# Patient Record
Sex: FEMALE | Race: WHITE | NOT HISPANIC OR LATINO | Employment: OTHER | URBAN - METROPOLITAN AREA
[De-identification: names, ages, dates, MRNs, and addresses within clinical notes are randomized per-mention and may not be internally consistent; named-entity substitution may affect disease eponyms.]

---

## 2017-05-10 ENCOUNTER — OFFICE VISIT (OUTPATIENT)
Dept: ORTHOPEDIC SURGERY | Facility: CLINIC | Age: 72
End: 2017-05-10

## 2017-05-10 VITALS
HEART RATE: 99 BPM | DIASTOLIC BLOOD PRESSURE: 98 MMHG | SYSTOLIC BLOOD PRESSURE: 149 MMHG | HEIGHT: 66 IN | BODY MASS INDEX: 28.93 KG/M2 | WEIGHT: 180 LBS

## 2017-05-10 DIAGNOSIS — R60.9 SWELLING: ICD-10-CM

## 2017-05-10 DIAGNOSIS — M25.571 PAIN, JOINT, ANKLE AND FOOT, RIGHT: Primary | ICD-10-CM

## 2017-05-10 PROCEDURE — 99204 OFFICE O/P NEW MOD 45 MIN: CPT | Performed by: ORTHOPAEDIC SURGERY

## 2017-05-10 RX ORDER — DILTIAZEM HYDROCHLORIDE 180 MG/1
180 CAPSULE, COATED, EXTENDED RELEASE ORAL DAILY
COMMUNITY

## 2017-05-10 RX ORDER — LEVOTHYROXINE SODIUM 0.05 MG/1
50 TABLET ORAL DAILY
COMMUNITY

## 2017-05-10 RX ORDER — ESTRADIOL 1 MG/1
1 TABLET ORAL DAILY
COMMUNITY

## 2017-05-10 RX ORDER — TRAMADOL HYDROCHLORIDE 50 MG/1
1 TABLET ORAL 3 TIMES DAILY PRN
Refills: 0 | COMMUNITY
Start: 2017-04-18

## 2017-05-10 RX ORDER — PANTOPRAZOLE SODIUM 40 MG/1
40 TABLET, DELAYED RELEASE ORAL DAILY
COMMUNITY

## 2017-05-30 ENCOUNTER — TELEPHONE (OUTPATIENT)
Dept: ORTHOPEDIC SURGERY | Facility: CLINIC | Age: 72
End: 2017-05-30

## 2017-06-01 ENCOUNTER — OFFICE VISIT (OUTPATIENT)
Dept: ORTHOPEDIC SURGERY | Facility: CLINIC | Age: 72
End: 2017-06-01

## 2017-06-01 VITALS
DIASTOLIC BLOOD PRESSURE: 103 MMHG | BODY MASS INDEX: 29.82 KG/M2 | HEART RATE: 98 BPM | WEIGHT: 179 LBS | SYSTOLIC BLOOD PRESSURE: 173 MMHG | HEIGHT: 65 IN

## 2017-06-01 DIAGNOSIS — M25.551 HIP PAIN, RIGHT: Primary | ICD-10-CM

## 2017-06-01 DIAGNOSIS — S79.911A HIP INJURY, RIGHT, INITIAL ENCOUNTER: ICD-10-CM

## 2017-06-01 DIAGNOSIS — M70.61 TROCHANTERIC BURSITIS OF RIGHT HIP: ICD-10-CM

## 2017-06-01 DIAGNOSIS — E66.9 OBESITY (BMI 30-39.9): ICD-10-CM

## 2017-06-01 PROCEDURE — 99215 OFFICE O/P EST HI 40 MIN: CPT | Performed by: ORTHOPAEDIC SURGERY

## 2017-06-01 RX ORDER — ZOLPIDEM TARTRATE 10 MG/1
TABLET ORAL
Refills: 0 | COMMUNITY
Start: 2017-05-11

## 2017-06-01 RX ORDER — NAPROXEN SODIUM 220 MG
220 TABLET ORAL 2 TIMES DAILY PRN
COMMUNITY

## 2017-06-01 RX ORDER — BUPROPION HYDROCHLORIDE 75 MG/1
75 TABLET ORAL DAILY
COMMUNITY

## 2017-06-01 NOTE — PROGRESS NOTES
Orthopaedic Clinic Note: Hip New Patient    Chief Complaint   Patient presents with   • Right Hip - Pain        HPI    Ana Roger is a 72 y.o. female who presents with right hip pain for 3 week(s). Patient was initially seeing Dr. Sheehan for right foot pain has been ongoing for a little over a month.  Since the onset of her foot pain, however she has developed a 3 week history of right hip pain that has progressed in severity and frequency.  The onset was atraumatic in nature. Pain is localized to groin and lateral trochanter and is a 7/10 on the pain scale. The pain is worse with walking, sitting, standing for long periods and bending the hip; resting and lying down improve the pain. Previous treatments have included: NSAIDS since symptom onset. Although some transient relief was reported with these interventions, these conservative measures have failed and symptoms have persisted. The patient is limited in daily activities and has had a significant decrease in quality of life as a result. She denies fevers, chills, or constitutional symptoms.  She denies any recent illnesses.      Past Medical History:   Diagnosis Date   • Asthma    • Degenerative arthritis    • Eye problems    • Hypertension    • Hypothyroid    • Kidney stones    • Osteoporosis    • Reflux esophagitis    • Shortness of breath       Past Surgical History:   Procedure Laterality Date   • APPENDECTOMY     • CHOLECYSTECTOMY     • FRACTURE SURGERY Left     arm   • INGUINAL HERNIA REPAIR Right    • KIDNEY STONE SURGERY        Social History     Social History   • Marital status:      Spouse name: N/A   • Number of children: N/A   • Years of education: N/A     Occupational History   • Not on file.     Social History Main Topics   • Smoking status: Never Smoker   • Smokeless tobacco: Never Used   • Alcohol use No   • Drug use: No   • Sexual activity: Defer     Other Topics Concern   • Not on file     Social History Narrative      Current  "Outpatient Prescriptions on File Prior to Visit   Medication Sig Dispense Refill   • diltiaZEM CD (CARDIZEM CD) 180 MG 24 hr capsule Take 180 mg by mouth Daily.     • estradiol (ESTRACE) 1 MG tablet Take 1 mg by mouth Daily.     • levothyroxine (SYNTHROID, LEVOTHROID) 50 MCG tablet Take 50 mcg by mouth Daily.     • pantoprazole (PROTONIX) 40 MG EC tablet Take 40 mg by mouth Daily.     • traMADol (ULTRAM) 50 MG tablet Take 1 tablet by mouth 3 (Three) Times a Day As Needed.  0     No current facility-administered medications on file prior to visit.       No Known Allergies     Review of Systems   Constitutional: Negative.    HENT:        Vision problems, sensitivity to light, ringing in ears   Eyes: Negative.    Respiratory: Positive for shortness of breath.    Cardiovascular: Positive for leg swelling (swelling in hands and feet).   Gastrointestinal:        Reflux, heartburn   Endocrine: Positive for cold intolerance.   Genitourinary: Negative.    Musculoskeletal: Positive for arthralgias, back pain, joint swelling and myalgias.   Skin: Negative.    Allergic/Immunologic: Negative.    Neurological: Positive for dizziness and headaches.   Hematological: Negative.    Psychiatric/Behavioral: The patient is nervous/anxious (depression).         The following portions of the patient's history were reviewed and updated as appropriate: allergies, current medications, past family history, past medical history, past social history, past surgical history and problem list.    Physical Exam  Blood pressure (!) 173/103, pulse 98, height 64.5\" (163.8 cm), weight 179 lb (81.2 kg).    Body mass index is 30.25 kg/(m^2).    GENERAL APPEARANCE: awake, alert & oriented x 3, in no acute distress, well developed, well nourished and overweight  PSYCH: normal affect  LUNGS:  breathing nonlabored  EYES: sclera anicteric  CARDIOVASCULAR: palpable dorsalis pedis, palpable posterior tibial bilaterally. Capillary refill less than 2 " seconds  EXTREMITIES: no clubbing, cyanosis  GAIT:  Trendelenberg           Right Hip Exam:  RANGE OF MOTION:   FLEXION CONTRACTURE: None  FLEXION: 110 degrees  INTERNAL ROTATION: 15 degrees at 90 degrees of flexion   EXTERNAL ROTATION: 35 degrees at 90 degrees of flexion    PAIN WITH HIP MOTION: yes, especially with internal rotation  PAIN WITH LOGROLL: no  STINCHFIELD TEST: positive    KNEE EXAM: full knee ROM (0-130), stable to varus/valgus stress at 0 and 30 degrees     STRENGTH:  4/5 hip adduction, abduction, flexion. 5/5 knee flexion, extension. 5/5 ankle dorsiflexion and plantarflexion.     GREATER TROCHANTER BURSAL PAIN:  yes     REFLEXES:   PATELLAR 2+/4   ACHILLES 2+/4    CLONUS: no  STRAIGHT LEG TEST:   negative    SENSATION TO LIGHT TOUCH:  DEEP PERONEAL/SUPERFICIAL PERONEAL/SURAL/SAPHENOUS/TIBIAL:  intact    EDEMA:   no  ERYTHEMA:  no  WOUNDS/INCISIONS: no        Left Hip Exam:   RANGE OF MOTION:   FLEXION CONTRACTURE: None   FLEXION: 110 degrees   INTERNAL ROTATION: 20 degrees at 90 degrees of flexion   EXTERNAL ROTATION: 40 degrees at 90 degrees of flexion    PAIN WITH HIP MOTION: no  PAIN WITH LOGROLL: no  STINCHFIELD TEST: negative    KNEE EXAM: full knee ROM (0-130), stable to varus/valgus stress at 0 and 30 degrees     STRENGTH:  5/5 hip adduction, abduction, flexion. 5/5 strength knee flexion, extension. 5/5 strength ankle dorsiflexion and plantarflexion.     GREATER TROCHANTER BURSAL PAIN:  no     REFLEXES:   PATELLAR 2+/4   ACHILLES 2+/4    CLONUS: negative  STRAIGHT LEG TEST:   negative    SENSATION TO LIGHT TOUCH:  DEEP PERONEAL/SUPERFICIAL PERONEAL/SURAL/SAPHENOUS/TIBIAL:  intact    EDEMA:   no  ERYTHEMA:  no  WOUNDS/INCISIONS: none, no overlying skin problems.      ------------------------------------------------------------------    LEG LENGTHS:  equal  _____________________________________________________  _____________________________________________________    RADIOGRAPHIC FINDINGS:    Indication: Right hip pain     Comparison: No prior xrays are available for comparison    AP pelvis, hip 2 views: Right: minimal arthritic findings with preservation of the joint space; Left: minimal arthritic findings with preservation of the joint space.  Right hip demonstrates no evidence of avascular necrosis or step off at the articular surface of the femoral head.  No evidence of occult fracture.    Assessment/Plan:   Diagnosis Plan   1. Hip pain, right  XR Hip With or Without Pelvis 1 View Right    MRI Hip Right Without Contrast   2. Hip injury, right, initial encounter  MRI Hip Right Without Contrast   3. Trochanteric bursitis of right hip  MRI Hip Right Without Contrast   4. Obesity (BMI 30-39.9)       I discussed with patient that she does have significant clinical findings concerning for intra-articular hip process with associated trochanteric bursitis.  In the absence of severe hip range of motion limitations, I'm less concerned about infection.  However I did explain to her that an occult process such as avascular necrosis or labral tear could be ongoing that is resulting in the right hip pain.  I gave her treatment options including MRI of the right hip, intra-articular injection into the right hip, or injection of the right hip trochanteric bursa.  I recommended an MRI to further evaluate for pathology.  She was extremely hesitant to do this which is why I offered her the other 2 injection options.  I explained to her that the MRI would be the best course of action in order to evaluate for possible soft tissue pathology versus occult bony pathology as a source of her pain.  After extensive discussion, she was agreeable to proceed with the MRI of the right hip.  She has a scheduled MRI of the right ankle/foot on 6/5.  We will attempt to get this hip MRI scheduled the same day.  I will see the patient back after the MRI has been obtained to discuss the results and treatment options.    Sudhakar STEPHENS  MD Rajni  06/01/17  2:18 PM

## 2017-06-05 ENCOUNTER — OFFICE VISIT (OUTPATIENT)
Dept: ORTHOPEDIC SURGERY | Facility: CLINIC | Age: 72
End: 2017-06-05

## 2017-06-05 ENCOUNTER — HOSPITAL ENCOUNTER (OUTPATIENT)
Dept: MRI IMAGING | Facility: HOSPITAL | Age: 72
Discharge: HOME | End: 2017-06-05
Attending: ORTHOPAEDIC SURGERY | Admitting: ORTHOPAEDIC SURGERY

## 2017-06-05 ENCOUNTER — LAB (OUTPATIENT)
Dept: LAB | Facility: HOSPITAL | Age: 72
End: 2017-06-05

## 2017-06-05 DIAGNOSIS — M25.551 HIP PAIN, RIGHT: Primary | ICD-10-CM

## 2017-06-05 DIAGNOSIS — M25.551 HIP PAIN, RIGHT: ICD-10-CM

## 2017-06-05 DIAGNOSIS — M25.571 PAIN, JOINT, ANKLE AND FOOT, RIGHT: ICD-10-CM

## 2017-06-05 DIAGNOSIS — M70.61 TROCHANTERIC BURSITIS OF RIGHT HIP: ICD-10-CM

## 2017-06-05 DIAGNOSIS — S79.911A HIP INJURY, RIGHT, INITIAL ENCOUNTER: ICD-10-CM

## 2017-06-05 PROBLEM — M25.579 PAIN, JOINT, ANKLE AND FOOT: Status: ACTIVE | Noted: 2017-06-05

## 2017-06-05 LAB
CRP SERPL-MCNC: 1.31 MG/DL (ref 0–1)
ERYTHROCYTE [SEDIMENTATION RATE] IN BLOOD: 35 MM/HR (ref 0–30)
URATE SERPL-MCNC: 5.1 MG/DL (ref 3.1–7.8)

## 2017-06-05 PROCEDURE — 84550 ASSAY OF BLOOD/URIC ACID: CPT | Performed by: ORTHOPAEDIC SURGERY

## 2017-06-05 PROCEDURE — 86140 C-REACTIVE PROTEIN: CPT | Performed by: ORTHOPAEDIC SURGERY

## 2017-06-05 PROCEDURE — 73721 MRI JNT OF LWR EXTRE W/O DYE: CPT

## 2017-06-05 PROCEDURE — 85652 RBC SED RATE AUTOMATED: CPT | Performed by: ORTHOPAEDIC SURGERY

## 2017-06-05 PROCEDURE — 73718 MRI LOWER EXTREMITY W/O DYE: CPT

## 2017-06-05 PROCEDURE — 99213 OFFICE O/P EST LOW 20 MIN: CPT | Performed by: ORTHOPAEDIC SURGERY

## 2017-06-05 PROCEDURE — 36415 COLL VENOUS BLD VENIPUNCTURE: CPT

## 2017-06-05 NOTE — PROGRESS NOTES
ESTABLISHED PATIENT    Patient: Ana Roger  : 1945    Primary Care Provider: Thomas Mahoney MD    Requesting Provider: As above    Follow-up of the Right Foot and Follow-up of the Right Ankle      History    Chief Complaint: right foot and hip pain    History of Present Illness: she returns after the MRI of the right foot- it is basically normal now, with a small amount of fluid around tendons, but the edema in the hindfoot and ankle has resolved.  She notes that it is much less painful now, only occasional ache in the midfoot.  The swelling is improved.  Now she is having severe right hip pain, she has an MRI that showed effusion, Dr Urban will see her this week, and asked me to order labs.    Current Outpatient Prescriptions on File Prior to Visit   Medication Sig Dispense Refill   • buPROPion (WELLBUTRIN) 75 MG tablet Take 75 mg by mouth Daily.     • diltiaZEM CD (CARDIZEM CD) 180 MG 24 hr capsule Take 180 mg by mouth Daily.     • estradiol (ESTRACE) 1 MG tablet Take 1 mg by mouth Daily.     • levothyroxine (SYNTHROID, LEVOTHROID) 50 MCG tablet Take 50 mcg by mouth Daily.     • naproxen sodium (ALEVE) 220 MG tablet Take 220 mg by mouth 2 (Two) Times a Day As Needed for Mild Pain (1-3).     • pantoprazole (PROTONIX) 40 MG EC tablet Take 40 mg by mouth Daily.     • traMADol (ULTRAM) 50 MG tablet Take 1 tablet by mouth 3 (Three) Times a Day As Needed.  0   • zolpidem (AMBIEN) 10 MG tablet TAKE ONE TABLET BY MOUTH EVERY NIGHT AT BEDTIME  0     No current facility-administered medications on file prior to visit.       No Known Allergies   Past Medical History:   Diagnosis Date   • Asthma    • Degenerative arthritis    • Eye problems    • Hypertension    • Hypothyroid    • Kidney stones    • Osteoporosis    • Reflux esophagitis    • Shortness of breath      Past Surgical History:   Procedure Laterality Date   • APPENDECTOMY     • CHOLECYSTECTOMY     • FRACTURE SURGERY Left     arm   • INGUINAL  HERNIA REPAIR Right    • KIDNEY STONE SURGERY       Family History   Problem Relation Age of Onset   • Heart attack Mother    • Heart disease Mother    • Hypertension Mother    • Osteoarthritis Mother    • Rheum arthritis Mother    • Stroke Mother    • Heart disease Father    • Heart disease Other    • Hypertension Other    • Osteoarthritis Other       Social History     Social History   • Marital status:      Spouse name: N/A   • Number of children: N/A   • Years of education: N/A     Occupational History   • Not on file.     Social History Main Topics   • Smoking status: Never Smoker   • Smokeless tobacco: Never Used   • Alcohol use No   • Drug use: No   • Sexual activity: Defer     Other Topics Concern   • Not on file     Social History Narrative        Review of Systems    The following portions of the patient's history were reviewed and updated as appropriate: allergies, current medications, past family history, past medical history, past social history, past surgical history and problem list.    Physical Exam:   There were no vitals taken for this visit.  Right foot is no longer tender, no swelling, normal ROM    Medical Decision Making    Data Review:   reviewed radiology images    and reviewed radiology results       Assessment/Plan/Diagnosis/Treatment Options:   Right foot is much improved.  I will be happy to see her any time.  I ordered labs and she will see Dr Urban this week.

## 2017-06-06 DIAGNOSIS — M25.451 HIP EFFUSION, RIGHT: Primary | ICD-10-CM

## 2017-06-07 ENCOUNTER — HOSPITAL ENCOUNTER (OUTPATIENT)
Dept: GENERAL RADIOLOGY | Facility: HOSPITAL | Age: 72
Discharge: HOME | End: 2017-06-07
Attending: ORTHOPAEDIC SURGERY | Admitting: ORTHOPAEDIC SURGERY

## 2017-06-07 DIAGNOSIS — M25.451 HIP EFFUSION, RIGHT: ICD-10-CM

## 2017-06-07 LAB
APPEARANCE FLD: ABNORMAL
COLOR FLD: COLORLESS
CRYSTALS FLD MICRO: NORMAL
RBC # FLD AUTO: <1000 /MM3
WBC # FLD: 0 /MM3

## 2017-06-07 PROCEDURE — 87205 SMEAR GRAM STAIN: CPT | Performed by: ORTHOPAEDIC SURGERY

## 2017-06-07 PROCEDURE — 87206 SMEAR FLUORESCENT/ACID STAI: CPT | Performed by: ORTHOPAEDIC SURGERY

## 2017-06-07 PROCEDURE — 87015 SPECIMEN INFECT AGNT CONCNTJ: CPT | Performed by: ORTHOPAEDIC SURGERY

## 2017-06-07 PROCEDURE — 89060 EXAM SYNOVIAL FLUID CRYSTALS: CPT | Performed by: ORTHOPAEDIC SURGERY

## 2017-06-07 PROCEDURE — 87070 CULTURE OTHR SPECIMN AEROBIC: CPT | Performed by: ORTHOPAEDIC SURGERY

## 2017-06-07 PROCEDURE — 87102 FUNGUS ISOLATION CULTURE: CPT | Performed by: ORTHOPAEDIC SURGERY

## 2017-06-07 PROCEDURE — 87116 MYCOBACTERIA CULTURE: CPT | Performed by: ORTHOPAEDIC SURGERY

## 2017-06-07 PROCEDURE — 77002 NEEDLE LOCALIZATION BY XRAY: CPT

## 2017-06-07 PROCEDURE — 87075 CULTR BACTERIA EXCEPT BLOOD: CPT | Performed by: ORTHOPAEDIC SURGERY

## 2017-06-07 PROCEDURE — 0 IOPAMIDOL 61 % SOLUTION: Performed by: ORTHOPAEDIC SURGERY

## 2017-06-07 PROCEDURE — 89050 BODY FLUID CELL COUNT: CPT | Performed by: ORTHOPAEDIC SURGERY

## 2017-06-07 RX ORDER — LIDOCAINE HYDROCHLORIDE 10 MG/ML
20 INJECTION, SOLUTION INFILTRATION; PERINEURAL ONCE
Status: COMPLETED | OUTPATIENT
Start: 2017-06-07 | End: 2017-06-07

## 2017-06-07 RX ADMIN — LIDOCAINE HYDROCHLORIDE 20 ML: 10 INJECTION, SOLUTION INFILTRATION; PERINEURAL at 13:50

## 2017-06-07 RX ADMIN — IOPAMIDOL 5 ML: 612 INJECTION, SOLUTION INTRAVENOUS at 13:51

## 2017-06-11 LAB
BACTERIA FLD CULT: NORMAL
GRAM STN SPEC: NORMAL
GRAM STN SPEC: NORMAL

## 2017-06-20 ENCOUNTER — OFFICE VISIT (OUTPATIENT)
Dept: ORTHOPEDIC SURGERY | Facility: CLINIC | Age: 72
End: 2017-06-20

## 2017-06-20 VITALS
HEART RATE: 98 BPM | DIASTOLIC BLOOD PRESSURE: 80 MMHG | SYSTOLIC BLOOD PRESSURE: 143 MMHG | WEIGHT: 180 LBS | BODY MASS INDEX: 29.99 KG/M2 | HEIGHT: 65 IN

## 2017-06-20 DIAGNOSIS — M25.551 PAIN OF RIGHT HIP JOINT: Primary | ICD-10-CM

## 2017-06-20 DIAGNOSIS — M12.851: ICD-10-CM

## 2017-06-20 PROCEDURE — 99213 OFFICE O/P EST LOW 20 MIN: CPT | Performed by: ORTHOPAEDIC SURGERY

## 2017-06-20 NOTE — PROGRESS NOTES
Orthopaedic Clinic Note: Hip Established Patient    Chief Complaint   Patient presents with   • Right Hip - Follow-up     MRI Rt hip 6/5/17 and Rt hip Fl guided aspiration 6/7/17        HPI    Patient returns for follow-up after MRI of right hip on 6/5/17 and fluoroscopic guided hip aspiration on 6/7/17.  Patient reports no interval changes in her hip pain.  She is here for follow-up regarding the aspiration results.  She rates her pain a 6/10 on the pain scale.  Her pain continues to be worse with initial weightbearing after sitting in the first couple steps of weightbearing.  After that her pain improves.  Resting and sitting duties her pain.  She is continuing to take anti-inflammatories as needed.  She is ambulating with no assistive device.      Past Medical History:   Diagnosis Date   • Asthma    • Degenerative arthritis    • Eye problems    • Hypertension    • Hypothyroid    • Kidney stones    • Osteoporosis    • Reflux esophagitis    • Shortness of breath       Past Surgical History:   Procedure Laterality Date   • APPENDECTOMY     • CHOLECYSTECTOMY     • FRACTURE SURGERY Left     arm   • INGUINAL HERNIA REPAIR Right    • KIDNEY STONE SURGERY        Social History     Social History   • Marital status:      Spouse name: N/A   • Number of children: N/A   • Years of education: N/A     Occupational History   • Not on file.     Social History Main Topics   • Smoking status: Never Smoker   • Smokeless tobacco: Never Used   • Alcohol use No   • Drug use: No   • Sexual activity: Defer     Other Topics Concern   • Not on file     Social History Narrative      Current Outpatient Prescriptions on File Prior to Visit   Medication Sig Dispense Refill   • buPROPion (WELLBUTRIN) 75 MG tablet Take 75 mg by mouth Daily.     • diltiaZEM CD (CARDIZEM CD) 180 MG 24 hr capsule Take 180 mg by mouth Daily.     • estradiol (ESTRACE) 1 MG tablet Take 1 mg by mouth Daily.     • levothyroxine (SYNTHROID, LEVOTHROID) 50 MCG  "tablet Take 50 mcg by mouth Daily.     • naproxen sodium (ALEVE) 220 MG tablet Take 220 mg by mouth 2 (Two) Times a Day As Needed for Mild Pain (1-3).     • pantoprazole (PROTONIX) 40 MG EC tablet Take 40 mg by mouth Daily.     • traMADol (ULTRAM) 50 MG tablet Take 1 tablet by mouth 3 (Three) Times a Day As Needed.  0   • zolpidem (AMBIEN) 10 MG tablet TAKE ONE TABLET BY MOUTH EVERY NIGHT AT BEDTIME  0     No current facility-administered medications on file prior to visit.       No Known Allergies     Review of Systems     Physical Exam  Blood pressure 143/80, pulse 98, height 64.5\" (163.8 cm), weight 180 lb (81.6 kg).    Body mass index is 30.42 kg/(m^2).    GENERAL APPEARANCE: awake, alert, oriented, in no acute distress  LUNGS:  breathing nonlabored  EXTREMITIES: no clubbing, cyanosis  PERIPHERAL PULSES: palpable dorsalis pedis and posterior tibial pulses bilaterally.    GAIT:  Antalgic            Hip Exam:  Right    RANGE OF MOTION:  FLEXION CONTRACTURE: None  FLEXION: 110 degrees  INTERNAL ROTATION: 15 degrees at 90 degrees of flexion   EXTERNAL ROTATION: 35 degrees at 90 degrees of flexion   PAIN WITH HIP MOTION: yes, especially with internal rotation  PAIN WITH LOGROLL: no  STINCHFIELD TEST: positive      STRENGTH:  ABDUCTOR:  4/5  ADDUCTOR:  5/5  HIP FLEXION:  4/5    GREATER TROCHANTER BURSAL PAIN:  yes    SENSATION TO LIGHT TOUCH:  DEEP PERONEAL/SUPERFICIAL PERONEAL/SURAL/SAPHENOUS/TIBIAL:   intact    EDEMA:  no  ERYTHEMA:  no  WOUNDS/INCISIONS:  no  _________________________________________________________________  _________________________________________________________________    RADIOGRAPHIC FINDINGS:   No new radiographs today     Assessment/Plan:   Diagnosis Plan   1. Pain of right hip joint     2. Transient arthropathy involving pelvic region and thigh, right       I discussed with the patient the findings of her hip aspiration which were negative for infection.  Clinically, given her history of " migratory pain from the ankle and now on the hip, it appears she is suffering from transient osteoporosis now involving the hip joint.  I explained to her that the treatment for this is symptomatic treatment including anti-inflammatories and restricted weightbearing only as necessary.  Corticosteroids such as Medrol Dosepak could be utilized only for symptom exacerbation.  However at this time I do not recommend this.  I explained that it would take approximately 6-12 months for the symptoms to resolve.  I will see the patient back in 3 months to monitor her progress.  If she does develop hip stiffness, physical therapy may also be required.  She will need x-rays AP pelvis upon return    Sudhakar Urban MD  06/20/17  4:04 PM

## 2017-06-21 LAB — BACTERIA SPEC ANAEROBE CULT: NORMAL

## 2017-06-28 ENCOUNTER — TELEPHONE (OUTPATIENT)
Dept: ORTHOPEDIC SURGERY | Facility: CLINIC | Age: 72
End: 2017-06-28

## 2017-06-28 NOTE — TELEPHONE ENCOUNTER
Dr Urban - please see note from patient.        I asked Dr Castro and she does not feel comfortable writing a letter for jury duty.  I will forward note to Dr Urban.    Candice

## 2017-06-28 NOTE — TELEPHONE ENCOUNTER
Spoke to the patient and gave her the information from the physicians.  She was fine with this and no further questions.  Candice

## 2017-07-19 LAB
FUNGUS WND CULT: NORMAL
MYCOBACTERIUM SPEC CULT: NORMAL
NIGHT BLUE STAIN TISS: NORMAL

## 2020-01-20 ENCOUNTER — HOSPITAL ENCOUNTER (OUTPATIENT)
Dept: MAMMOGRAPHY | Facility: HOSPITAL | Age: 75
Discharge: HOME | End: 2020-01-20
Admitting: INTERNAL MEDICINE

## 2020-01-20 DIAGNOSIS — Z12.31 VISIT FOR SCREENING MAMMOGRAM: ICD-10-CM

## 2020-01-20 PROCEDURE — 77067 SCR MAMMO BI INCL CAD: CPT | Performed by: RADIOLOGY

## 2020-01-20 PROCEDURE — 77063 BREAST TOMOSYNTHESIS BI: CPT | Performed by: RADIOLOGY

## 2020-01-20 PROCEDURE — 77067 SCR MAMMO BI INCL CAD: CPT

## 2020-01-20 PROCEDURE — 77063 BREAST TOMOSYNTHESIS BI: CPT

## 2020-02-19 ENCOUNTER — HOSPITAL ENCOUNTER (OUTPATIENT)
Dept: MAMMOGRAPHY | Facility: HOSPITAL | Age: 75
Discharge: HOME | End: 2020-02-19
Admitting: RADIOLOGY

## 2020-02-19 DIAGNOSIS — R92.8 ABNORMAL MAMMOGRAM: ICD-10-CM

## 2020-02-19 PROCEDURE — 77065 DX MAMMO INCL CAD UNI: CPT

## 2020-02-19 PROCEDURE — G0279 TOMOSYNTHESIS, MAMMO: HCPCS

## 2020-02-19 PROCEDURE — 77065 DX MAMMO INCL CAD UNI: CPT | Performed by: RADIOLOGY

## 2020-02-19 PROCEDURE — G0279 TOMOSYNTHESIS, MAMMO: HCPCS | Performed by: RADIOLOGY

## 2022-01-28 ENCOUNTER — TRANSCRIBE ORDERS (OUTPATIENT)
Dept: LAB | Facility: HOSPITAL | Age: 77
End: 2022-01-28

## 2022-01-28 DIAGNOSIS — Z01.818 OTHER SPECIFIED PRE-OPERATIVE EXAMINATION: Primary | ICD-10-CM

## 2022-08-12 ENCOUNTER — TRANSCRIBE ORDERS (OUTPATIENT)
Dept: ADMINISTRATIVE | Facility: HOSPITAL | Age: 77
End: 2022-08-12

## 2022-08-12 DIAGNOSIS — E04.2 NONTOXIC MULTINODULAR GOITER: Primary | ICD-10-CM

## 2022-09-01 ENCOUNTER — HOSPITAL ENCOUNTER (OUTPATIENT)
Dept: ULTRASOUND IMAGING | Facility: HOSPITAL | Age: 77
Discharge: HOME | End: 2022-09-01
Admitting: OTOLARYNGOLOGY

## 2022-09-01 DIAGNOSIS — E04.2 NONTOXIC MULTINODULAR GOITER: ICD-10-CM

## 2022-09-01 PROCEDURE — 76536 US EXAM OF HEAD AND NECK: CPT

## 2022-09-01 PROCEDURE — 76536 US EXAM OF HEAD AND NECK: CPT | Performed by: RADIOLOGY

## 2023-11-16 DIAGNOSIS — M25.561 RIGHT KNEE PAIN, UNSPECIFIED CHRONICITY: Primary | ICD-10-CM

## 2023-11-20 ENCOUNTER — OFFICE VISIT (OUTPATIENT)
Dept: ORTHOPEDIC SURGERY | Facility: CLINIC | Age: 78
End: 2023-11-20
Payer: MEDICARE

## 2023-11-20 ENCOUNTER — HOSPITAL ENCOUNTER (OUTPATIENT)
Dept: GENERAL RADIOLOGY | Facility: HOSPITAL | Age: 78
Discharge: HOME | End: 2023-11-20
Admitting: ORTHOPAEDIC SURGERY
Payer: MEDICARE

## 2023-11-20 VITALS
WEIGHT: 179.9 LBS | DIASTOLIC BLOOD PRESSURE: 80 MMHG | HEIGHT: 64 IN | SYSTOLIC BLOOD PRESSURE: 149 MMHG | HEART RATE: 59 BPM | BODY MASS INDEX: 30.71 KG/M2

## 2023-11-20 DIAGNOSIS — S83.241A ACUTE MEDIAL MENISCUS TEAR OF RIGHT KNEE, INITIAL ENCOUNTER: ICD-10-CM

## 2023-11-20 DIAGNOSIS — M17.11 PRIMARY OSTEOARTHRITIS OF RIGHT KNEE: Primary | ICD-10-CM

## 2023-11-20 DIAGNOSIS — M25.561 RIGHT KNEE PAIN, UNSPECIFIED CHRONICITY: ICD-10-CM

## 2023-11-20 PROCEDURE — 73562 X-RAY EXAM OF KNEE 3: CPT

## 2023-11-20 RX ORDER — ESOMEPRAZOLE MAGNESIUM 40 MG/1
40 CAPSULE, DELAYED RELEASE ORAL DAILY
COMMUNITY

## 2023-11-20 RX ORDER — FLUOXETINE HYDROCHLORIDE 20 MG/1
20 CAPSULE ORAL DAILY
COMMUNITY

## 2023-11-20 RX ORDER — DOCUSATE SODIUM 100 MG/1
100 CAPSULE, LIQUID FILLED ORAL 2 TIMES DAILY
COMMUNITY

## 2023-11-20 RX ORDER — ROSUVASTATIN CALCIUM 5 MG/1
5 TABLET, COATED ORAL DAILY
COMMUNITY

## 2023-11-20 RX ORDER — METOPROLOL SUCCINATE 100 MG/1
100 TABLET, EXTENDED RELEASE ORAL DAILY
COMMUNITY

## 2023-11-20 RX ORDER — ERGOCALCIFEROL 1.25 MG/1
50000 CAPSULE ORAL
COMMUNITY

## 2023-11-20 RX ADMIN — METHYLPREDNISOLONE ACETATE 40 MG: 40 INJECTION, SUSPENSION INTRA-ARTICULAR; INTRALESIONAL; INTRAMUSCULAR; SOFT TISSUE at 10:46

## 2023-11-20 RX ADMIN — LIDOCAINE HYDROCHLORIDE 5 ML: 10 INJECTION, SOLUTION EPIDURAL; INFILTRATION; INTRACAUDAL; PERINEURAL at 10:46

## 2023-11-20 NOTE — PROGRESS NOTES
New Patient Visit      Patient: Ana Roger  YOB: 1945  Date of Encounter: 11/20/2023        Chief Complaint:   Chief Complaint   Patient presents with    Right Knee - Pain, Initial Evaluation, Fall    Right Hip - Pain, Initial Evaluation, Fall           HPI:   Ana Roger, 78 y.o. female, referred by Alpa Christopher MD presents evaluation of right knee pain which began October 25, 2023 she was cleaning windows when she stepped on the ladder lost her balance fell backwards.  She has experienced right knee pain since and denies significant knee pain prior to this.  Had numerous falls after her knee injury for several days and has had no further falls.  She has not experienced syncopal episode.  Denies giving way or locking of her right knee.  She has undergone right total hip replacement 2018 at the [facility].  Past medical history is for gout or rheumatoid arthritis.        Active Problem List:  Patient Active Problem List   Diagnosis    Hip pain, right    Pain, joint, ankle and foot           Past Medical History:  Past Medical History:   Diagnosis Date    Asthma     Degenerative arthritis     Eye problems     Hypertension     Hypothyroid     Kidney stones     Osteoporosis     Reflux esophagitis     Shortness of breath            Past Surgical History:  Past Surgical History:   Procedure Laterality Date    APPENDECTOMY      CHOLECYSTECTOMY      FRACTURE SURGERY Left     arm    HYSTERECTOMY      1980's    INGUINAL HERNIA REPAIR Right     KIDNEY STONE SURGERY             Family History:  Family History   Problem Relation Age of Onset    Heart attack Mother     Heart disease Mother     Hypertension Mother     Osteoarthritis Mother     Rheum arthritis Mother     Stroke Mother     Heart disease Father     Heart disease Other     Hypertension Other     Osteoarthritis Other     Breast cancer Neg Hx          Social History:  Social History     Socioeconomic History    Marital  status:    Tobacco Use    Smoking status: Never    Smokeless tobacco: Never   Vaping Use    Vaping Use: Never used   Substance and Sexual Activity    Alcohol use: No    Drug use: No    Sexual activity: Defer     Body mass index is 30.41 kg/m².      Medications:  Current Outpatient Medications   Medication Sig Dispense Refill    docusate sodium (COLACE) 100 MG capsule Take 1 capsule by mouth 2 (Two) Times a Day.      esomeprazole (nexIUM) 40 MG capsule Take 1 capsule by mouth Daily.      estradiol (ESTRACE) 1 MG tablet Take 1 tablet by mouth Daily.      FLUoxetine (PROzac) 20 MG capsule Take 1 capsule by mouth Daily.      levothyroxine (SYNTHROID, LEVOTHROID) 50 MCG tablet Take 1 tablet by mouth Daily.      metoprolol succinate XL (TOPROL-XL) 100 MG 24 hr tablet Take 1 tablet by mouth Daily.      rosuvastatin (CRESTOR) 5 MG tablet Take 1 tablet by mouth Daily.      traMADol (ULTRAM) 50 MG tablet Take 1 tablet by mouth 3 (Three) Times a Day As Needed.  0    Vitamin D, Ergocalciferol, 09527 units capsule Take 1 capsule by mouth Every 7 (Seven) Days.      buPROPion (WELLBUTRIN) 75 MG tablet Take 1 tablet by mouth Daily.      diltiaZEM CD (CARDIZEM CD) 180 MG 24 hr capsule Take 1 capsule by mouth Daily.      naproxen sodium (ALEVE) 220 MG tablet Take 220 mg by mouth 2 (Two) Times a Day As Needed for Mild Pain (1-3). (Patient not taking: Reported on 11/20/2023)      pantoprazole (PROTONIX) 40 MG EC tablet Take 1 tablet by mouth Daily.      zolpidem (AMBIEN) 10 MG tablet TAKE ONE TABLET BY MOUTH EVERY NIGHT AT BEDTIME  0     No current facility-administered medications for this visit.         Allergies:  No Known Allergies      Review of Systems:   Review of Systems   Constitutional:  Negative for chills, fatigue and fever.   HENT:  Positive for sinus pain. Negative for congestion, ear pain, facial swelling, sore throat, trouble swallowing and voice change.    Eyes:  Positive for pain and discharge. Negative for  "redness and visual disturbance.   Respiratory: Negative.  Negative for apnea, cough, choking, chest tightness, shortness of breath, wheezing and stridor.    Cardiovascular:  Positive for palpitations. Negative for chest pain and leg swelling.   Gastrointestinal:  Positive for constipation. Negative for abdominal distention, abdominal pain, blood in stool, nausea and vomiting.   Endocrine: Negative.  Negative for cold intolerance, heat intolerance, polydipsia and polyphagia.   Genitourinary: Negative.  Negative for difficulty urinating, dysuria, flank pain, frequency and hematuria.   Musculoskeletal:  Positive for arthralgias, back pain, gait problem and neck pain.   Skin:  Positive for rash. Negative for color change, pallor and wound.   Allergic/Immunologic: Negative.  Negative for environmental allergies, food allergies and immunocompromised state.   Neurological:  Positive for weakness and numbness. Negative for dizziness, tremors, seizures, syncope, speech difficulty, light-headedness and headaches.   Hematological:  Negative for adenopathy. Bruises/bleeds easily.   Psychiatric/Behavioral:  Negative for behavioral problems, confusion, dysphoric mood, self-injury, sleep disturbance and suicidal ideas. The patient is nervous/anxious.          Physical Exam:   Physical Exam  GENERAL: 78 y.o. female, alert and oriented X 3 in no acute distress.   Visit Vitals  /80   Pulse 59   Ht 163.8 cm (64.49\")   Wt 81.6 kg (179 lb 14.3 oz)   BMI 30.41 kg/m²       GENERAL APPEARANCE: Awake, alert & oriented, in no acute distress and well developed, well nourished.   PSYCH: Normal mood and affect  LUNGS: Breathing nonlabored, no wheezing  EYES: Sclera anicteric, pupils equal  CARDIOVASCULAR: Palpable pulses. Capillary refill less than 2 seconds  INTEGUMENTARY: Skin intact, co clubbing, cyanosis  NEUROLOGIC: Normal Sensation  MUSCULOSKELETAL:  Orthopedic Examination: Right knee demonstrates full effusion with moderate medial " joint line tenderness she has full extension with flexion beyond 90 degrees and no gross instability Lachman and drawer negative neurovascular examination is grossly intact.          Radiology/Labs:     XR Knee 3 View Right    Result Date: 11/20/2023  1.  No acute fracture or dislocation. 2.  Chondrocalcinosis.   This report was finalized on 11/20/2023 10:54 AM by Dr. Alexys Ramirez MD.           Radiographs right knee demonstrate mild medial joint space narrowing, squaring medial femoral condyle and calcification within the medial and lateral meniscus.    MRI right knee by report describes contusion to the proximal lateral tibia with marrow edema LCL tear horizontal tear involving the posterior horn medial meniscus.        Assessment & Plan:   78 y.o. female presents with right knee injury sustained October 25, 2023 with medial meniscus tear superimposed on osteoarthritis.  Discussed her options before considering arthroscopic partial medial meniscectomy she is given a trial of intra-articular steroid injections providing Depo-Medrol 40 mg with lidocaine block.  We will reevaluate in 3 weeks.        ICD-10-CM ICD-9-CM   1. Primary osteoarthritis of right knee  M17.11 715.16         Large Joint Arthrocentesis: R knee  Date/Time: 11/20/2023 10:46 AM  Consent given by: patient  Site marked: site marked  Timeout: Immediately prior to procedure a time out was called to verify the correct patient, procedure, equipment, support staff and site/side marked as required   Supporting Documentation  Indications: pain   Procedure Details  Location: knee - R knee  Preparation: Patient was prepped and draped in the usual sterile fashion  Needle size: 25 G  Approach: anterolateral  Medications administered: 40 mg methylPREDNISolone acetate 40 MG/ML; 5 mL lidocaine PF 1% 1 %  Patient tolerance: patient tolerated the procedure well with no immediate complications        Cc:   Alpa Christopher MD                This document has  been electronically signed by Michael Cisneros MD   November 20, 2023 11:01 EST

## 2023-11-25 RX ORDER — METHYLPREDNISOLONE ACETATE 40 MG/ML
40 INJECTION, SUSPENSION INTRA-ARTICULAR; INTRALESIONAL; INTRAMUSCULAR; SOFT TISSUE
Status: COMPLETED | OUTPATIENT
Start: 2023-11-20 | End: 2023-11-20

## 2023-11-25 RX ORDER — LIDOCAINE HYDROCHLORIDE 10 MG/ML
5 INJECTION, SOLUTION EPIDURAL; INFILTRATION; INTRACAUDAL; PERINEURAL
Status: COMPLETED | OUTPATIENT
Start: 2023-11-20 | End: 2023-11-20

## 2023-11-28 ENCOUNTER — PATIENT ROUNDING (BHMG ONLY) (OUTPATIENT)
Dept: ORTHOPEDIC SURGERY | Facility: CLINIC | Age: 78
End: 2023-11-28
Payer: MEDICARE

## 2023-11-28 NOTE — PROGRESS NOTES
November 28, 2023    Hello, may I speak with Ana Roger?    My name is Dionna    I am  with [facility] [place]  [facility]  [address]  [place] [address] [phone].    Before we get started may I verify your date of birth? 1945    I am calling to officially welcome you to our practice and ask about your recent visit. Is this a good time to talk? yes    Tell me about your visit with us. What things went well?  Everything was good. Very pleased.        We're always looking for ways to make our patients' experiences even better. Do you have recommendations on ways we may improve?  no    Overall were you satisfied with your first visit to our practice? yes       I appreciate you taking the time to speak with me today. Is there anything else I can do for you? no      Thank you, and have a great day.

## 2023-12-13 ENCOUNTER — OFFICE VISIT (OUTPATIENT)
Dept: ORTHOPEDIC SURGERY | Facility: CLINIC | Age: 78
End: 2023-12-13
Payer: MEDICARE

## 2023-12-13 VITALS — WEIGHT: 179.9 LBS | HEIGHT: 64 IN | BODY MASS INDEX: 30.71 KG/M2

## 2023-12-13 DIAGNOSIS — M17.11 PRIMARY OSTEOARTHRITIS OF RIGHT KNEE: Primary | ICD-10-CM

## 2023-12-13 PROCEDURE — 99213 OFFICE O/P EST LOW 20 MIN: CPT | Performed by: ORTHOPAEDIC SURGERY

## 2023-12-13 NOTE — PROGRESS NOTES
Follow-up Visit         Patient: Ana Roger  YOB: 1945  Date of Encounter: 12/13/2023      Chief  Complaint:   Chief Complaint   Patient presents with    Right Knee - Pain, Edema, Follow-up         HPI:  Ana Roger, 78 y.o. female presents in follow-up knee pain which began October 25 when she fell.  She had numerous falls after this.  She reports she now doing well right knee pain is much better and she has had no further falls.  She received intra-articular steroid injection right knee last visit, November 20, 2023 she continues to do well.        Medical History:  Patient Active Problem List   Diagnosis    Hip pain, right    Pain, joint, ankle and foot     Past Medical History:   Diagnosis Date    Asthma     Degenerative arthritis     Eye problems     Hypertension     Hypothyroid     Kidney stones     Osteoporosis     Reflux esophagitis     Shortness of breath            Social History:  Social History     Socioeconomic History    Marital status:    Tobacco Use    Smoking status: Never    Smokeless tobacco: Never   Vaping Use    Vaping Use: Never used   Substance and Sexual Activity    Alcohol use: No    Drug use: No    Sexual activity: Defer           Current Medications:    Current Outpatient Medications:     buPROPion (WELLBUTRIN) 75 MG tablet, Take 1 tablet by mouth Daily., Disp: , Rfl:     diltiaZEM CD (CARDIZEM CD) 180 MG 24 hr capsule, Take 1 capsule by mouth Daily., Disp: , Rfl:     docusate sodium (COLACE) 100 MG capsule, Take 1 capsule by mouth 2 (Two) Times a Day., Disp: , Rfl:     esomeprazole (nexIUM) 40 MG capsule, Take 1 capsule by mouth Daily., Disp: , Rfl:     estradiol (ESTRACE) 1 MG tablet, Take 1 tablet by mouth Daily., Disp: , Rfl:     FLUoxetine (PROzac) 20 MG capsule, Take 1 capsule by mouth Daily., Disp: , Rfl:     levothyroxine (SYNTHROID, LEVOTHROID) 50 MCG tablet, Take 1 tablet by mouth Daily., Disp: , Rfl:     metoprolol succinate XL (TOPROL-XL) 100  MG 24 hr tablet, Take 1 tablet by mouth Daily., Disp: , Rfl:     naproxen sodium (ALEVE) 220 MG tablet, Take 1 tablet by mouth 2 (Two) Times a Day As Needed for Mild Pain., Disp: , Rfl:     pantoprazole (PROTONIX) 40 MG EC tablet, Take 1 tablet by mouth Daily., Disp: , Rfl:     rosuvastatin (CRESTOR) 5 MG tablet, Take 1 tablet by mouth Daily., Disp: , Rfl:     traMADol (ULTRAM) 50 MG tablet, Take 1 tablet by mouth 3 (Three) Times a Day As Needed., Disp: , Rfl: 0    Vitamin D, Ergocalciferol, 20422 units capsule, Take 1 capsule by mouth Every 7 (Seven) Days., Disp: , Rfl:     zolpidem (AMBIEN) 10 MG tablet, TAKE ONE TABLET BY MOUTH EVERY NIGHT AT BEDTIME, Disp: , Rfl: 0        Allergies:  No Known Allergies        Family History:  Family History   Problem Relation Age of Onset    Heart attack Mother     Heart disease Mother     Hypertension Mother     Osteoarthritis Mother     Rheum arthritis Mother     Stroke Mother     Heart disease Father     Heart disease Other     Hypertension Other     Osteoarthritis Other     Breast cancer Neg Hx            Surgical History:  Past Surgical History:   Procedure Laterality Date    APPENDECTOMY      CHOLECYSTECTOMY      FRACTURE SURGERY Left     arm    HYSTERECTOMY      1980's    INGUINAL HERNIA REPAIR Right     KIDNEY STONE SURGERY             Radiology:   XR Knee 3 View Right    Result Date: 11/20/2023  1.  No acute fracture or dislocation. 2.  Chondrocalcinosis.   This report was finalized on 11/20/2023 10:54 AM by Dr. Alexys Ramirez MD.           Radiographs again reviewed demonstrate 20% narrowing medial joint space squaring medial femoral condyle and calcification of both menisci.          Orthopedic Examination: Knee demonstrates minimal effusion with mild medial joint line tenderness full flexion extension with no gross instability.          Assessment & Plan:   78 y.o. female presents with known osteoarthritis right knee evidenced by her radiographs with previous MRI  identifying medial meniscus tear.  As she is doing well after receiving intra-articular steroid injection we will please allow her to follow-up in the future as needed.  I do not think she is a candidate for arthroscopy right knee but rather I think she will eventually require total knee arthroplasty.           Diagnosis Plan   1. Primary osteoarthritis of right knee                    Cc:  Alpa Christopher MD              This document has been electronically signed by Michael Cisneros MD   December 17, 2023 20:44 EST